# Patient Record
Sex: FEMALE | Race: BLACK OR AFRICAN AMERICAN | Employment: FULL TIME | URBAN - METROPOLITAN AREA
[De-identification: names, ages, dates, MRNs, and addresses within clinical notes are randomized per-mention and may not be internally consistent; named-entity substitution may affect disease eponyms.]

---

## 2017-12-16 ENCOUNTER — HOSPITAL ENCOUNTER (OUTPATIENT)
Dept: MAMMOGRAPHY | Age: 57
Discharge: HOME | End: 2017-12-16
Attending: FAMILY MEDICINE
Payer: COMMERCIAL

## 2017-12-16 DIAGNOSIS — Z12.31 VISIT FOR SCREENING MAMMOGRAM: ICD-10-CM

## 2017-12-16 PROCEDURE — 77067 SCR MAMMO BI INCL CAD: CPT

## 2018-04-18 ENCOUNTER — HOSPITAL ENCOUNTER (EMERGENCY)
Age: 58
Discharge: HOME | End: 2018-04-18
Attending: EMERGENCY MEDICINE
Payer: COMMERCIAL

## 2018-04-18 VITALS
BODY MASS INDEX: 32.32 KG/M2 | WEIGHT: 194 LBS | HEIGHT: 65 IN | TEMPERATURE: 98.1 F | OXYGEN SATURATION: 99 % | DIASTOLIC BLOOD PRESSURE: 82 MMHG | SYSTOLIC BLOOD PRESSURE: 147 MMHG | HEART RATE: 68 BPM | RESPIRATION RATE: 16 BRPM

## 2018-04-18 DIAGNOSIS — R31.9 HEMATURIA, UNSPECIFIED TYPE: Primary | ICD-10-CM

## 2018-04-18 LAB
APPEARANCE UR: ABNORMAL
BILIRUB UR QL: NEGATIVE
COLOR UR: ABNORMAL
GLUCOSE UR STRIP.AUTO-MCNC: NEGATIVE MG/DL
HGB UR QL STRIP: ABNORMAL
KETONES UR QL STRIP.AUTO: NEGATIVE MG/DL
LEUKOCYTE ESTERASE UR QL STRIP.AUTO: NEGATIVE
NITRITE UR QL STRIP.AUTO: NEGATIVE
PH UR STRIP: 7 [PH] (ref 5–8)
PROT UR STRIP-MCNC: >300 MG/DL
RBC #/AREA URNS HPF: NORMAL /HPF (ref 0–5)
SP GR UR REFRACTOMETRY: 1.02 (ref 1–1.03)
UROBILINOGEN UR QL STRIP.AUTO: 0.2 EU/DL (ref 0.2–1)

## 2018-04-18 PROCEDURE — 87186 SC STD MICRODIL/AGAR DIL: CPT | Performed by: EMERGENCY MEDICINE

## 2018-04-18 PROCEDURE — 74011250637 HC RX REV CODE- 250/637: Performed by: EMERGENCY MEDICINE

## 2018-04-18 PROCEDURE — 87077 CULTURE AEROBIC IDENTIFY: CPT | Performed by: EMERGENCY MEDICINE

## 2018-04-18 PROCEDURE — 87086 URINE CULTURE/COLONY COUNT: CPT | Performed by: EMERGENCY MEDICINE

## 2018-04-18 PROCEDURE — 99283 EMERGENCY DEPT VISIT LOW MDM: CPT

## 2018-04-18 PROCEDURE — 81001 URINALYSIS AUTO W/SCOPE: CPT | Performed by: EMERGENCY MEDICINE

## 2018-04-18 RX ORDER — CIPROFLOXACIN 500 MG/1
500 TABLET ORAL 2 TIMES DAILY
Qty: 14 TAB | Refills: 0 | Status: SHIPPED | OUTPATIENT
Start: 2018-04-18 | End: 2018-04-25

## 2018-04-18 RX ORDER — PHENAZOPYRIDINE HYDROCHLORIDE 100 MG/1
200 TABLET, FILM COATED ORAL
Status: COMPLETED | OUTPATIENT
Start: 2018-04-18 | End: 2018-04-18

## 2018-04-18 RX ORDER — CIPROFLOXACIN 500 MG/1
500 TABLET ORAL
Status: COMPLETED | OUTPATIENT
Start: 2018-04-18 | End: 2018-04-18

## 2018-04-18 RX ORDER — PHENAZOPYRIDINE HYDROCHLORIDE 200 MG/1
200 TABLET, FILM COATED ORAL 3 TIMES DAILY
Qty: 6 TAB | Refills: 0 | Status: SHIPPED | OUTPATIENT
Start: 2018-04-18 | End: 2018-04-20

## 2018-04-18 RX ORDER — ESTRADIOL 1 MG/1
1 TABLET ORAL DAILY
COMMUNITY

## 2018-04-18 RX ORDER — ROSUVASTATIN CALCIUM 10 MG/1
10 TABLET, COATED ORAL
COMMUNITY

## 2018-04-18 RX ORDER — LISINOPRIL 2.5 MG/1
2.5 TABLET ORAL DAILY
COMMUNITY

## 2018-04-18 RX ADMIN — CIPROFLOXACIN 500 MG: 500 TABLET, FILM COATED ORAL at 20:23

## 2018-04-18 RX ADMIN — PHENAZOPYRIDINE HYDROCHLORIDE 200 MG: 100 TABLET ORAL at 20:24

## 2018-04-18 NOTE — ED PROVIDER NOTES
HPI Comments: Pt c/o blood in urine w urgency and dysuria. X few hours. H/o uti, says feels sim. Last 10 yrs ago. No fever. No vomiting. No abd pain, but feels crampy in abd when has urgency./  No fever, no vomiting. Patient is a 60 y.o. female presenting with hematuria. Blood in Urine    Associated symptoms include hematuria. Pertinent negatives include no vomiting, no abdominal pain and no back pain. Past Medical History:   Diagnosis Date    Fibroid uterus     Seizures ([facility] [address] 1985    had seizure when daughter was born,        Past Surgical History:   Procedure Laterality Date    HX COLONOSCOPY      HX OTHER SURGICAL      dermatology         Family History:   Problem Relation Age of Onset    Diabetes Mother     Hypertension Mother        Social History     Social History    Marital status:      Spouse name: N/A    Number of children: N/A    Years of education: N/A     Occupational History    Not on file. Social History Main Topics    Smoking status: Never Smoker    Smokeless tobacco: Never Used    Alcohol use No    Drug use: No    Sexual activity: Yes     Partners: Male     Other Topics Concern    Not on file     Social History Narrative         ALLERGIES: Codeine; Sulfa (sulfonamide antibiotics); and Sulfate salt    Review of Systems   Constitutional: Negative for fever. HENT: Negative for congestion. Respiratory: Negative for cough and shortness of breath. Cardiovascular: Negative for chest pain. Gastrointestinal: Negative for abdominal pain and vomiting. Genitourinary: Positive for dysuria and hematuria. Musculoskeletal: Negative for back pain. Skin: Negative for rash. Neurological: Negative for light-headedness. All other systems reviewed and are negative.       Vitals:    04/18/18 1857 04/18/18 2025   BP: (!) 165/92 147/82   Pulse: 71 68   Resp: 16    Temp: 98.1 °F (36.7 °C)    SpO2: 100% 99%   Weight: 88 kg (194 lb)    Height: 5' 5\" (1.651 m) Physical Exam   Constitutional: She is oriented to person, place, and time. She appears well-developed. HENT:   Head: Normocephalic. Mouth/Throat: Oropharynx is clear and moist.   Eyes: Pupils are equal, round, and reactive to light. Neck: Normal range of motion. Cardiovascular: Normal rate and normal heart sounds. No murmur heard. Pulmonary/Chest: Effort normal. She has no wheezes. She has no rales. Abdominal: Soft. There is no tenderness. Musculoskeletal: Normal range of motion. She exhibits no edema. Neurological: She is alert and oriented to person, place, and time. Skin: Skin is warm and dry. Nursing note and vitals reviewed. [facility]      ED Course       Procedures    Vitals:  Patient Vitals for the past 12 hrs:   Temp Pulse Resp BP SpO2   04/18/18 2025 - 68 - 147/82 99 %   04/18/18 1857 98.1 °F (36.7 °C) 71 16 (!) 165/92 100 %         Medications ordered:   Medications   ciprofloxacin HCl (CIPRO) tablet 500 mg (500 mg Oral Given 4/18/18 2023)   phenazopyridine (PYRIDIUM) tablet 200 mg (200 mg Oral Given 4/18/18 2024)         Lab findings:  Recent Results (from the past 12 hour(s))   URINALYSIS W/ RFLX MICROSCOPIC    Collection Time: 04/18/18  6:58 PM   Result Value Ref Range    Color RED      Appearance CLOUDY      Specific gravity 1.020 1.003 - 1.030      pH (UA) 7.0 5.0 - 8.0      Protein >300 (A) NEG mg/dL    Glucose NEGATIVE  NEG mg/dL    Ketone NEGATIVE  NEG mg/dL    Bilirubin NEGATIVE  NEG      Blood LARGE (A) NEG      Urobilinogen 0.2 0.2 - 1.0 EU/dL    Nitrites NEGATIVE  NEG      Leukocyte Esterase NEGATIVE  NEG     URINE MICROSCOPIC ONLY    Collection Time: 04/18/18  6:58 PM   Result Value Ref Range    RBC TOO NUMEROUS TO COUNT 0 - 5 /hpf           X-Ray, CT or other radiology findings or impressions:  No orders to display       Progress notes, Consult notes or additional Procedure notes:   8:01 PM pt pain free, no complaints curr. Af,nl vitals.   Understands risk of cancer as cause. Pt verb und of return inst and need for close urology f/u to r/o cancer. Declines ct abd/pelvis as offered today, says classic sx's of uti. tx given, although no signs of uti on ua. Urine cx sent. Urology referral sent, to dc now per pt request although rec ct. Disposition:  Diagnosis:   1. Hematuria, unspecified type        Disposition: home    Follow-up Information     Follow up With Details Comments [address] Chuy Hdz MD Schedule an appointment as soon as possible for a visit in 1 day  [address] [facility] [address] [phone]      [facility] Schedule an appointment as soon as possible for a visit in 2 days  [address] [facility] [id number] [phone]           Discharge Medication List as of 4/18/2018  8:01 PM      START taking these medications    Details   ciprofloxacin HCl (CIPRO) 500 mg tablet Take 1 Tab by mouth two (2) times a day for 7 days. , Print, Disp-14 Tab, R-0      phenazopyridine (PYRIDIUM) 200 mg tablet Take 1 Tab by mouth three (3) times daily for 2 days. , Print, Disp-6 Tab, R-0         CONTINUE these medications which have NOT CHANGED    Details   rosuvastatin (CRESTOR) 10 mg tablet Take 10 mg by mouth nightly., Historical Med      estradiol (ESTRACE) 1 mg tablet Take 1 mg by mouth daily. , Historical Med      lisinopril (PRINIVIL, ZESTRIL) 2.5 mg tablet Take 2.5 mg by mouth daily. , Historical Med      cholecalciferol (VITAMIN D3) 1,000 unit tablet Take  by mouth daily. , Historical Med      multivitamin (ONE A DAY) tablet Take 1 Tab by mouth daily. , Historical Med      calcium 500 mg tab Take 1 Tab by mouth daily. , Historical Med      fish oil-dha-epa 1,200-144-216 mg cap Take 1 Tab by mouth daily. , Historical Med      ferrous sulfate (IRON) 325 mg (65 mg iron) tablet Take  by mouth Daily (before breakfast). , Historical Med

## 2018-04-19 NOTE — DISCHARGE INSTRUCTIONS
Return for any pain, fever, shortness of breath, vomiting, decreased fluid intake, weakness, numbness, dizziness, or any change or concerns - or for cat scan imaging as we discussed. Follow up closely with urology as we discussed.

## 2018-04-22 LAB
BACTERIA SPEC CULT: ABNORMAL
SERVICE CMNT-IMP: ABNORMAL

## 2018-05-18 ENCOUNTER — HOSPITAL ENCOUNTER (OUTPATIENT)
Dept: VASCULAR SURGERY | Age: 58
Discharge: HOME | End: 2018-05-18
Attending: PHYSICIAN ASSISTANT
Payer: COMMERCIAL

## 2018-05-18 DIAGNOSIS — R60.0 LOCALIZED EDEMA: ICD-10-CM

## 2018-05-18 PROCEDURE — 93971 EXTREMITY STUDY: CPT

## 2018-08-13 ENCOUNTER — HOSPITAL ENCOUNTER (OUTPATIENT)
Dept: ULTRASOUND IMAGING | Age: 58
Discharge: HOME | End: 2018-08-13
Attending: FAMILY MEDICINE
Payer: COMMERCIAL

## 2018-08-13 DIAGNOSIS — E04.1 THYROID NODULE: ICD-10-CM

## 2018-08-13 PROCEDURE — 76536 US EXAM OF HEAD AND NECK: CPT

## 2018-09-12 ENCOUNTER — HOSPITAL ENCOUNTER (OUTPATIENT)
Dept: LAB | Age: 58
Discharge: HOME | End: 2018-09-12
Payer: COMMERCIAL

## 2018-09-12 LAB
T4 FREE SERPL-MCNC: 1 NG/DL (ref 0.7–1.5)
TSH SERPL DL<=0.05 MIU/L-ACNC: 1.11 UIU/ML (ref 0.36–3.74)

## 2018-09-12 PROCEDURE — 36415 COLL VENOUS BLD VENIPUNCTURE: CPT | Performed by: OTOLARYNGOLOGY

## 2018-09-12 PROCEDURE — 84439 ASSAY OF FREE THYROXINE: CPT | Performed by: OTOLARYNGOLOGY

## 2018-09-12 PROCEDURE — 84443 ASSAY THYROID STIM HORMONE: CPT | Performed by: OTOLARYNGOLOGY

## 2018-10-05 ENCOUNTER — HOSPITAL ENCOUNTER (OUTPATIENT)
Dept: ULTRASOUND IMAGING | Age: 58
Discharge: HOME | End: 2018-10-05
Attending: OTOLARYNGOLOGY
Payer: COMMERCIAL

## 2018-10-05 DIAGNOSIS — E04.2 NONTOXIC MULTINODULAR GOITER: ICD-10-CM

## 2018-10-05 PROCEDURE — 88172 CYTP DX EVAL FNA 1ST EA SITE: CPT | Performed by: OTOLARYNGOLOGY

## 2018-10-05 PROCEDURE — 88173 CYTOPATH EVAL FNA REPORT: CPT | Performed by: OTOLARYNGOLOGY

## 2018-10-05 PROCEDURE — 88305 TISSUE EXAM BY PATHOLOGIST: CPT | Performed by: OTOLARYNGOLOGY

## 2018-10-05 PROCEDURE — 88177 CYTP FNA EVAL EA ADDL: CPT | Performed by: OTOLARYNGOLOGY

## 2018-10-05 PROCEDURE — 10022 US GUIDE FINE NDL ASP THYROID: CPT

## 2018-10-05 NOTE — PROCEDURES
RADIOLOGY POST PROCEDURE NOTE     October 5, 2018       2:15 PM     Preoperative Diagnosis:   Bilateral thyroid nodules. Postoperative Diagnosis:  Same. :  Dr. Marielle Adkins    Assistant:  None. Type of Anesthesia: 1% plain lidocaine. Procedure/Description:  Image guided bilateral thyroid nodules FNA. Findings:   No bleeding. Estimated blood Loss:  Minimal    Specimen Removed:   yes    Blood transfusions:  None. Implants:  None.     Complications: None    Condition: Stable    Discharge Plan:  discharge home     Lul Amor MD

## 2019-02-02 ENCOUNTER — HOSPITAL ENCOUNTER (OUTPATIENT)
Dept: MAMMOGRAPHY | Age: 59
Discharge: HOME | End: 2019-02-02
Attending: FAMILY MEDICINE
Payer: COMMERCIAL

## 2019-02-02 DIAGNOSIS — Z12.31 VISIT FOR SCREENING MAMMOGRAM: ICD-10-CM

## 2019-02-02 PROCEDURE — 77063 BREAST TOMOSYNTHESIS BI: CPT

## 2019-08-19 ENCOUNTER — HOSPITAL ENCOUNTER (OUTPATIENT)
Dept: GENERAL RADIOLOGY | Age: 59
Discharge: HOME | End: 2019-08-19
Payer: COMMERCIAL

## 2019-08-19 DIAGNOSIS — M79.671 RIGHT FOOT PAIN: ICD-10-CM

## 2019-08-19 PROCEDURE — 73630 X-RAY EXAM OF FOOT: CPT

## 2019-10-28 ENCOUNTER — HOSPITAL ENCOUNTER (OUTPATIENT)
Dept: ULTRASOUND IMAGING | Age: 59
Discharge: HOME | End: 2019-10-28
Attending: OTOLARYNGOLOGY
Payer: COMMERCIAL

## 2019-10-28 DIAGNOSIS — E04.1 NONTOXIC SINGLE THYROID NODULE: ICD-10-CM

## 2019-10-28 PROCEDURE — 76536 US EXAM OF HEAD AND NECK: CPT

## 2020-02-08 ENCOUNTER — HOSPITAL ENCOUNTER (OUTPATIENT)
Dept: MAMMOGRAPHY | Age: 60
Discharge: HOME | End: 2020-02-08
Attending: FAMILY MEDICINE
Payer: COMMERCIAL

## 2020-02-08 DIAGNOSIS — Z12.31 VISIT FOR SCREENING MAMMOGRAM: ICD-10-CM

## 2020-02-08 PROCEDURE — 77063 BREAST TOMOSYNTHESIS BI: CPT

## 2020-02-19 ENCOUNTER — HOSPITAL ENCOUNTER (OUTPATIENT)
Dept: MAMMOGRAPHY | Age: 60
Discharge: HOME | End: 2020-02-19
Attending: FAMILY MEDICINE
Payer: COMMERCIAL

## 2020-02-19 ENCOUNTER — HOSPITAL ENCOUNTER (OUTPATIENT)
Dept: ULTRASOUND IMAGING | Age: 60
Discharge: HOME | End: 2020-02-19
Attending: FAMILY MEDICINE
Payer: COMMERCIAL

## 2020-02-19 DIAGNOSIS — R92.8 ABNORMAL MAMMOGRAM: ICD-10-CM

## 2020-02-19 PROCEDURE — 76642 ULTRASOUND BREAST LIMITED: CPT

## 2020-02-19 PROCEDURE — 77061 BREAST TOMOSYNTHESIS UNI: CPT

## 2020-09-15 ENCOUNTER — HOSPITAL ENCOUNTER (OUTPATIENT)
Dept: ULTRASOUND IMAGING | Age: 60
Discharge: HOME | End: 2020-09-15
Attending: PHYSICIAN ASSISTANT
Payer: COMMERCIAL

## 2020-09-15 DIAGNOSIS — E04.1 CYST OF THYROID: ICD-10-CM

## 2020-09-15 PROCEDURE — 76536 US EXAM OF HEAD AND NECK: CPT

## 2020-10-14 ENCOUNTER — TRANSCRIBE ORDER (OUTPATIENT)
Dept: SCHEDULING | Age: 60
End: 2020-10-14

## 2020-10-14 DIAGNOSIS — L04.1 ACUTE LYMPHADENITIS OF TRUNK: Primary | ICD-10-CM

## 2021-01-20 ENCOUNTER — TRANSCRIBE ORDER (OUTPATIENT)
Dept: SCHEDULING | Age: 61
End: 2021-01-20

## 2021-01-20 DIAGNOSIS — Z12.31 VISIT FOR SCREENING MAMMOGRAM: Primary | ICD-10-CM

## 2021-03-08 ENCOUNTER — TRANSCRIBE ORDER (OUTPATIENT)
Dept: SCHEDULING | Age: 61
End: 2021-03-08

## 2021-03-08 DIAGNOSIS — E04.1 NONTOXIC UNINODULAR GOITER: Primary | ICD-10-CM

## 2021-03-26 ENCOUNTER — HOSPITAL ENCOUNTER (OUTPATIENT)
Dept: MAMMOGRAPHY | Age: 61
Discharge: HOME | End: 2021-03-26
Payer: COMMERCIAL

## 2021-03-26 DIAGNOSIS — Z12.31 VISIT FOR SCREENING MAMMOGRAM: ICD-10-CM

## 2021-03-26 PROCEDURE — 77067 SCR MAMMO BI INCL CAD: CPT

## 2021-04-28 ENCOUNTER — HOSPITAL ENCOUNTER (OUTPATIENT)
Dept: ULTRASOUND IMAGING | Age: 61
Discharge: HOME | End: 2021-04-28
Attending: OTOLARYNGOLOGY
Payer: COMMERCIAL

## 2021-04-28 DIAGNOSIS — E04.1 NONTOXIC UNINODULAR GOITER: ICD-10-CM

## 2021-04-28 PROCEDURE — 76536 US EXAM OF HEAD AND NECK: CPT

## 2021-12-06 ENCOUNTER — TRANSCRIBE ORDER (OUTPATIENT)
Dept: SCHEDULING | Age: 61
End: 2021-12-06

## 2021-12-06 DIAGNOSIS — L04.1 ACUTE LYMPHADENITIS OF TRUNK: Primary | ICD-10-CM

## 2021-12-06 DIAGNOSIS — E04.2 NONTOXIC MULTINODULAR GOITER: ICD-10-CM

## 2021-12-21 ENCOUNTER — HOSPITAL ENCOUNTER (OUTPATIENT)
Dept: ULTRASOUND IMAGING | Age: 61
Discharge: HOME | End: 2021-12-21
Attending: OTOLARYNGOLOGY
Payer: COMMERCIAL

## 2021-12-21 DIAGNOSIS — E04.2 NONTOXIC MULTINODULAR GOITER: ICD-10-CM

## 2021-12-21 DIAGNOSIS — L04.1 ACUTE LYMPHADENITIS OF TRUNK: ICD-10-CM

## 2021-12-21 PROCEDURE — 76536 US EXAM OF HEAD AND NECK: CPT

## 2022-01-10 ENCOUNTER — TRANSCRIBE ORDER (OUTPATIENT)
Dept: SCHEDULING | Age: 62
End: 2022-01-10

## 2022-01-10 DIAGNOSIS — Z12.31 SCREENING MAMMOGRAM, ENCOUNTER FOR: Primary | ICD-10-CM

## 2022-03-30 ENCOUNTER — HOSPITAL ENCOUNTER (OUTPATIENT)
Dept: MAMMOGRAPHY | Age: 62
Discharge: HOME | End: 2022-03-30
Payer: COMMERCIAL

## 2022-03-30 DIAGNOSIS — Z12.31 SCREENING MAMMOGRAM, ENCOUNTER FOR: ICD-10-CM

## 2022-03-30 PROCEDURE — 77063 BREAST TOMOSYNTHESIS BI: CPT

## 2022-08-31 ENCOUNTER — HOSPITAL ENCOUNTER (OUTPATIENT)
Dept: LAB | Age: 62
Discharge: HOME | End: 2022-08-31
Payer: COMMERCIAL

## 2022-08-31 LAB
25(OH)D3 SERPL-MCNC: 40.4 NG/ML (ref 30–100)
ALBUMIN SERPL-MCNC: 3.7 G/DL (ref 3.4–5)
ALBUMIN/GLOB SERPL: 1.2 {RATIO} (ref 0.8–1.7)
ALP SERPL-CCNC: 61 U/L (ref 45–117)
ALT SERPL-CCNC: 26 U/L (ref 13–56)
ANION GAP SERPL CALC-SCNC: 5 MMOL/L (ref 3–18)
AST SERPL-CCNC: 19 U/L (ref 10–38)
BASOPHILS # BLD: 0 K/UL (ref 0–0.1)
BASOPHILS NFR BLD: 1 % (ref 0–2)
BILIRUB SERPL-MCNC: 0.3 MG/DL (ref 0.2–1)
BUN SERPL-MCNC: 12 MG/DL (ref 7–18)
BUN/CREAT SERPL: 16 (ref 12–20)
CALCIUM SERPL-MCNC: 9.3 MG/DL (ref 8.5–10.1)
CHLORIDE SERPL-SCNC: 106 MMOL/L (ref 100–111)
CHOLEST SERPL-MCNC: 163 MG/DL
CO2 SERPL-SCNC: 29 MMOL/L (ref 21–32)
CREAT SERPL-MCNC: 0.77 MG/DL (ref 0.6–1.3)
DIFFERENTIAL METHOD BLD: ABNORMAL
EOSINOPHIL # BLD: 0.1 K/UL (ref 0–0.4)
EOSINOPHIL NFR BLD: 2 % (ref 0–5)
ERYTHROCYTE [DISTWIDTH] IN BLOOD BY AUTOMATED COUNT: 11.6 % (ref 11.6–14.5)
GLOBULIN SER CALC-MCNC: 3.2 G/DL (ref 2–4)
GLUCOSE SERPL-MCNC: 106 MG/DL (ref 74–99)
HCT VFR BLD AUTO: 38.4 % (ref 35–45)
HDLC SERPL-MCNC: 78 MG/DL (ref 40–60)
HDLC SERPL: 2.1 {RATIO} (ref 0–5)
HGB BLD-MCNC: 12.5 G/DL (ref 12–16)
IMM GRANULOCYTES # BLD AUTO: 0 K/UL (ref 0–0.04)
IMM GRANULOCYTES NFR BLD AUTO: 0 % (ref 0–0.5)
LDLC SERPL CALC-MCNC: 68.2 MG/DL (ref 0–100)
LIPID PROFILE,FLP: ABNORMAL
LYMPHOCYTES # BLD: 2.3 K/UL (ref 0.9–3.6)
LYMPHOCYTES NFR BLD: 38 % (ref 21–52)
MCH RBC QN AUTO: 30.9 PG (ref 24–34)
MCHC RBC AUTO-ENTMCNC: 32.6 G/DL (ref 31–37)
MCV RBC AUTO: 95 FL (ref 78–100)
MONOCYTES # BLD: 0.3 K/UL (ref 0.05–1.2)
MONOCYTES NFR BLD: 5 % (ref 3–10)
NEUTS SEG # BLD: 3.3 K/UL (ref 1.8–8)
NEUTS SEG NFR BLD: 54 % (ref 40–73)
NRBC # BLD: 0 K/UL (ref 0–0.01)
NRBC BLD-RTO: 0 PER 100 WBC
PLATELET # BLD AUTO: 263 K/UL (ref 135–420)
PMV BLD AUTO: 9.5 FL (ref 9.2–11.8)
POTASSIUM SERPL-SCNC: 4.1 MMOL/L (ref 3.5–5.5)
PROT SERPL-MCNC: 6.9 G/DL (ref 6.4–8.2)
RBC # BLD AUTO: 4.04 M/UL (ref 4.2–5.3)
SODIUM SERPL-SCNC: 140 MMOL/L (ref 136–145)
TRIGL SERPL-MCNC: 84 MG/DL (ref ?–150)
VLDLC SERPL CALC-MCNC: 16.8 MG/DL
WBC # BLD AUTO: 6.1 K/UL (ref 4.6–13.2)

## 2022-08-31 PROCEDURE — 80053 COMPREHEN METABOLIC PANEL: CPT

## 2022-08-31 PROCEDURE — 85025 COMPLETE CBC W/AUTO DIFF WBC: CPT

## 2022-08-31 PROCEDURE — 82306 VITAMIN D 25 HYDROXY: CPT

## 2022-08-31 PROCEDURE — 36415 COLL VENOUS BLD VENIPUNCTURE: CPT

## 2022-08-31 PROCEDURE — 80061 LIPID PANEL: CPT

## 2022-11-28 ENCOUNTER — TRANSCRIBE ORDER (OUTPATIENT)
Dept: SCHEDULING | Age: 62
End: 2022-11-28

## 2022-11-28 DIAGNOSIS — E04.2 NON-TOXIC MULTINODULAR GOITER: ICD-10-CM

## 2022-11-28 DIAGNOSIS — E04.1 NONTOXIC UNINODULAR GOITER: Primary | ICD-10-CM

## 2022-12-21 ENCOUNTER — HOSPITAL ENCOUNTER (OUTPATIENT)
Dept: ULTRASOUND IMAGING | Age: 62
Discharge: HOME | End: 2022-12-21
Attending: OTOLARYNGOLOGY
Payer: COMMERCIAL

## 2022-12-21 DIAGNOSIS — E04.2 NON-TOXIC MULTINODULAR GOITER: ICD-10-CM

## 2022-12-21 DIAGNOSIS — E04.1 NONTOXIC UNINODULAR GOITER: ICD-10-CM

## 2022-12-21 PROCEDURE — 76536 US EXAM OF HEAD AND NECK: CPT

## 2023-03-16 ENCOUNTER — TRANSCRIBE ORDERS (OUTPATIENT)
Facility: HOSPITAL | Age: 63
End: 2023-03-16

## 2023-03-16 DIAGNOSIS — Z12.31 VISIT FOR SCREENING MAMMOGRAM: Primary | ICD-10-CM

## 2023-04-15 ENCOUNTER — HOSPITAL ENCOUNTER (OUTPATIENT)
Facility: HOSPITAL | Age: 63
Discharge: HOME | End: 2023-04-18
Payer: COMMERCIAL

## 2023-04-15 DIAGNOSIS — Z12.31 VISIT FOR SCREENING MAMMOGRAM: ICD-10-CM

## 2023-04-15 PROCEDURE — 77063 BREAST TOMOSYNTHESIS BI: CPT

## 2023-12-29 ENCOUNTER — HOSPITAL ENCOUNTER (OUTPATIENT)
Facility: HOSPITAL | Age: 63
Discharge: HOME | End: 2023-12-29
Attending: OTOLARYNGOLOGY
Payer: COMMERCIAL

## 2023-12-29 DIAGNOSIS — E04.2 NONTOXIC MULTINODULAR GOITER: ICD-10-CM

## 2023-12-29 PROCEDURE — 76536 US EXAM OF HEAD AND NECK: CPT

## 2024-04-04 ENCOUNTER — TRANSCRIBE ORDERS (OUTPATIENT)
Facility: HOSPITAL | Age: 64
End: 2024-04-04

## 2024-04-04 DIAGNOSIS — Z12.31 VISIT FOR SCREENING MAMMOGRAM: Primary | ICD-10-CM

## 2024-04-27 ENCOUNTER — HOSPITAL ENCOUNTER (OUTPATIENT)
Facility: HOSPITAL | Age: 64
Discharge: HOME | End: 2024-04-30

## 2024-04-27 VITALS — HEIGHT: 65 IN | BODY MASS INDEX: 32.49 KG/M2 | WEIGHT: 195 LBS

## 2024-04-27 DIAGNOSIS — Z12.31 VISIT FOR SCREENING MAMMOGRAM: ICD-10-CM

## 2024-05-15 ENCOUNTER — HOSPITAL ENCOUNTER (OUTPATIENT)
Facility: HOSPITAL | Age: 64
Discharge: HOME | End: 2024-05-18
Payer: COMMERCIAL

## 2024-05-15 VITALS — BODY MASS INDEX: 32.65 KG/M2 | HEIGHT: 65 IN | WEIGHT: 196 LBS

## 2024-05-15 DIAGNOSIS — N63.20 MASS OF LEFT BREAST, UNSPECIFIED QUADRANT: ICD-10-CM

## 2024-05-15 PROCEDURE — 76642 ULTRASOUND BREAST LIMITED: CPT

## 2024-05-15 PROCEDURE — G0279 TOMOSYNTHESIS, MAMMO: HCPCS

## 2024-12-23 ENCOUNTER — HOSPITAL ENCOUNTER (OUTPATIENT)
Facility: HOSPITAL | Age: 64
Discharge: HOME | End: 2024-12-26
Attending: OTOLARYNGOLOGY
Payer: MEDICARE

## 2024-12-23 DIAGNOSIS — E04.1 NONTOXIC SINGLE THYROID NODULE: ICD-10-CM

## 2024-12-23 PROCEDURE — 76536 US EXAM OF HEAD AND NECK: CPT

## 2025-01-09 ENCOUNTER — TRANSCRIBE ORDERS (OUTPATIENT)
Facility: HOSPITAL | Age: 65
End: 2025-01-09

## 2025-01-09 DIAGNOSIS — E04.1 NONTOXIC SINGLE THYROID NODULE: Primary | ICD-10-CM

## 2025-03-13 ENCOUNTER — HOSPITAL ENCOUNTER (OUTPATIENT)
Facility: HOSPITAL | Age: 65
Discharge: HOME | End: 2025-03-16
Payer: MEDICARE

## 2025-03-13 DIAGNOSIS — E04.1 NONTOXIC SINGLE THYROID NODULE: ICD-10-CM

## 2025-03-13 PROCEDURE — 88177 CYTP FNA EVAL EA ADDL: CPT

## 2025-03-13 PROCEDURE — 88173 CYTOPATH EVAL FNA REPORT: CPT

## 2025-03-13 PROCEDURE — 88172 CYTP DX EVAL FNA 1ST EA SITE: CPT

## 2025-03-13 PROCEDURE — 10006 FNA BX W/US GDN EA ADDL: CPT

## 2025-03-13 NOTE — H&P
OUTPATIENT HISTORY AND PHYSICAL    Today 3/13/2025     Indication/Symptoms:   Sera Angeles is a 65 y.o. female with imaging showing bilateral thyroid nodules requiring further diagnostics who presents for a bilateral US-guided thyroid nodule FNA biopsy.     Current Meds:    Prior to Admission medications    Medication Sig Start Date End Date Taking? Authorizing Provider   vitamin D (CHOLECALCIFEROL) 25 MCG (1000 UT) TABS tablet Take by mouth daily    Automatic Reconciliation, Ar   estradiol (ESTRACE) 1 MG tablet Take 1 mg by mouth daily    Automatic Reconciliation, Ar   ferrous sulfate (IRON 325) 325 (65 Fe) MG tablet Take by mouth every morning (before breakfast)    Automatic Reconciliation, Ar   lisinopril (PRINIVIL;ZESTRIL) 2.5 MG tablet Take 2.5 mg by mouth daily    Automatic Reconciliation, Ar   rosuvastatin (CRESTOR) 10 MG tablet Take 10 mg by mouth    Automatic Reconciliation, Ar       Allergies:      Allergies   Allergen Reactions    Codeine Nausea And Vomiting    Sulfa Antibiotics Nausea And Vomiting and Hives     Patient states, \"It's Sulfa medications I'm allergic to.\"         Comorbid Conditions:    Past Medical History:   Diagnosis Date    Fibroid uterus     Seizures ([facility]) 1985    had seizure when daughter was born,           Past Surgical History:   Procedure Laterality Date    COLONOSCOPY      OTHER SURGICAL HISTORY      dermatology     Data:    There were no vitals taken for this visit.:  No results for input(s): \"PLT\", \"HCT\" in the last 72 hours.  No results for input(s): \"INR\", \"APTT\" in the last 72 hours.    Invalid input(s): \"PT\"    The H & P and/or progress notes and any available imaging were reviewed.  The risks, indications and possible alternatives to the procedure, including doing nothing, were discussed and informed consent was obtained.    Physical Exam:      Mental status:   Alert and oriented.   Heart:   Regular rate.   Lungs:  Normal respiratory effort.    The patient is an  appropriate candidate to undergo the planned procedure.     ANIL Cloud

## 2025-03-13 NOTE — PROCEDURES
PROCEDURE NOTE  Date: 3/13/2025   Name: Sera Angeles  YOB: 1960    Procedures      RADIOLOGY POST PROCEDURE NOTE     March 13, 2025       10:56 AM     Preoperative Diagnosis:   Bilateral thyroid nodules    Postoperative Diagnosis:  Same.    :  ANIL Cloud     Assistant:  Dr. Zamora    Type of Anesthesia: 1% lidocaine locally    Procedure/Description:  Image-guided bilateral thyroid nodule FNA biopsy    Findings:   Successful bilateral thyroid nodule FNA biopsy  4 passes with a 25 gauge needle in each nodule  Pathologist present in the room to confirm specimen adequacy    Estimated blood Loss:  Minimal    Specimen Removed:  Yes    Blood transfusions:  None.    Implants:  None.    Complications: None    Condition: Stable    Blood thinning medications: OK to resume as clinically indicated    Discharge Plan:  discharge home    ANIL Cloud

## 2025-06-14 ENCOUNTER — HOSPITAL ENCOUNTER (OUTPATIENT)
Facility: HOSPITAL | Age: 65
Discharge: HOME | End: 2025-06-17
Attending: FAMILY MEDICINE
Payer: MEDICARE

## 2025-06-14 VITALS — HEIGHT: 60 IN | BODY MASS INDEX: 38.48 KG/M2 | WEIGHT: 196 LBS

## 2025-06-14 DIAGNOSIS — Z12.31 VISIT FOR SCREENING MAMMOGRAM: ICD-10-CM

## 2025-06-14 PROCEDURE — 77063 BREAST TOMOSYNTHESIS BI: CPT
